# Patient Record
Sex: FEMALE | Race: OTHER | NOT HISPANIC OR LATINO | URBAN - METROPOLITAN AREA
[De-identification: names, ages, dates, MRNs, and addresses within clinical notes are randomized per-mention and may not be internally consistent; named-entity substitution may affect disease eponyms.]

---

## 2021-12-28 ENCOUNTER — EMERGENCY (EMERGENCY)
Facility: HOSPITAL | Age: 31
LOS: 1 days | Discharge: HOME | End: 2021-12-28
Admitting: EMERGENCY MEDICINE
Payer: SELF-PAY

## 2021-12-28 VITALS
SYSTOLIC BLOOD PRESSURE: 115 MMHG | OXYGEN SATURATION: 97 % | TEMPERATURE: 98 F | RESPIRATION RATE: 15 BRPM | HEIGHT: 68 IN | WEIGHT: 143.3 LBS | DIASTOLIC BLOOD PRESSURE: 69 MMHG | HEART RATE: 89 BPM

## 2021-12-28 DIAGNOSIS — Z76.0 ENCOUNTER FOR ISSUE OF REPEAT PRESCRIPTION: ICD-10-CM

## 2021-12-28 DIAGNOSIS — F32.9 MAJOR DEPRESSIVE DISORDER, SINGLE EPISODE, UNSPECIFIED: ICD-10-CM

## 2021-12-28 PROCEDURE — 99283 EMERGENCY DEPT VISIT LOW MDM: CPT

## 2021-12-28 RX ORDER — SERTRALINE 25 MG/1
1 TABLET, FILM COATED ORAL
Qty: 14 | Refills: 0
Start: 2021-12-28 | End: 2022-01-10

## 2021-12-28 NOTE — ED ADULT NURSE NOTE - PUPILS PERRL
[facility]    PATIENT'S NAME: Paulo Salinas   ATTENDING PHYSICIAN: Jacy Melvin MD   PATIENT ACCOUNT#:   [id number]    LOCATION:  [address] [place] [address] #:   [id number]       YOB: 1989  ADMISSION DATE:       03/28/20 Bowel sounds present. EXTREMITIES:  No edema. LABORATORY STUDIES:  Reviewed. Potassium of 3.1, glucose 312. Troponin of 0.05. Total cholesterol 278. Triglycerides of 790. The LDL cannot be calculated.   Potassium on admission was 2.6.  UA is negati yes

## 2021-12-28 NOTE — ED ADULT NURSE NOTE - OBJECTIVE STATEMENT
pt a&ox3 here for prescription of zoloft, visitng from [place] since 12/27 and lost medications. will continue to monitor.

## 2021-12-28 NOTE — ED ADULT TRIAGE NOTE - CHIEF COMPLAINT QUOTE
patient here needing prescription of zoloft; visiting from [place] since 27th and lost package of meds

## 2021-12-28 NOTE — ED PROVIDER NOTE - NSFOLLOWUPINSTRUCTIONS_ED_ALL_ED_FT
Medicine Refill    WHAT YOU NEED TO KNOW:    You may have been given a prescription in the emergency department for a few days of your medicine. It is important to refill your medicine before you completely run out. You need to follow up with your healthcare provider for a full prescription within the next few days. You will not be given additional refills in the emergency department.     DISCHARGE INSTRUCTIONS:    Follow up with your healthcare provider: Contact your healthcare provider before you are completely out of medicine. Write down your questions so you remember to ask them during your visits.     Refill tips: Your medicine will treat your condition if you take the medicine regularly. Prevent missed doses by doing the following:   •Keep a chart of your medicine. Include all of your current medicines. Write down the name and strength of each medicine, the prescription number, and the number of refills. Also write down the dates of your refills. Ask your pharmacy or insurance provider for other ways to help you keep track of your medicines.      •Refill medicines a few days before you run out. This will decrease any problems that will prevent you from getting your medicines on time. Problems include a closed pharmacy, or the pharmacy may have to contact your healthcare provider.      •If you know you are going to be traveling, refill your medicines before you leave. You may not be able to get refills if you do not use your local pharmacy. You may need to call your insurance provider to make them aware of your travels. Depression    Depression is a mental illness that usually causes feelings of sadness, hopelessness, or helplessness. Some people with this disorder do not feel particularly sad but lose interest in doing things they used to enjoy. Major depressive disorder also can cause physical symptoms. It can interfere with work, school, relationships, and other normal everyday activities. If you were started on a medication, make sure to take exactly as prescribed and follow up with a psychiatrist.    SEEK IMMEDIATE MEDICAL CARE IF YOU HAVE ANY OF THE FOLLOWING SYMPTOMS: thoughts about hurting or killing yourself, thoughts about hurting or killing somebody else, hallucinations, or worsening depression.

## 2021-12-28 NOTE — ED ADULT NURSE NOTE - CHIEF COMPLAINT QUOTE
patient here needing prescription of zoloft; visiting from [place] since 27th and lost package of meds

## 2021-12-28 NOTE — ED PROVIDER NOTE - OBJECTIVE STATEMENT
32 yo F, h/o depression, currently visiting from [place] and requesting a refill on her Zoloft after losing the last few of her pills. Pt plans to return home to [place] on January 3rd.   She denies thomas current symptoms or complaints at this time. No SI/HI.

## 2021-12-28 NOTE — ED PROVIDER NOTE - CLINICAL SUMMARY MEDICAL DECISION MAKING FREE TEXT BOX
Pt presenting for medication refill of Zoloft. RX sent to local pharmacy. Pt denies SI/HI and has no complaints. She is stable on DC.

## 2021-12-28 NOTE — ED PROVIDER NOTE - PATIENT PORTAL LINK FT
You can access the FollowMyHealth Patient Portal offered by [facility] by registering at the following website: [email or web address] By joining [employer]’s FollowMyHealth portal, you will also be able to view your health information using other applications (apps) compatible with our system.
